# Patient Record
(demographics unavailable — no encounter records)

---

## 2025-03-24 NOTE — END OF VISIT
[FreeTextEntry3] : I have seen the patient and reviewed the case together with PA and I agree with the final recommendations and plan of care.  Eric Rosa MD Neurosurgery  [Time Spent: ___ minutes] : I have spent [unfilled] minutes of time on the encounter which excludes teaching and separately reported services.

## 2025-03-24 NOTE — REASON FOR VISIT
[FreeTextEntry1] : Mr. Tomas returns today for interval follow up and imaging review s/p Posterior Cervical C3-6 laminectomy and fusion on 5/2/23 to treat his cervical myelopathy.  Today, he reports worsening neck pain and radiculopathy into the shoulder. He denies numbness/tingling, weakness, bowel/bladder dysfunction.  His spasticity has been improving and he is requiring the baclofen only as needed.  Updated Cspine Xray AP/lateral reveals good placement of hardware.  He has been continuing with ongoing PT course since January and he continues to demonstrate improvement in his gait and myelopathic symptoms.   7/31/24 Mr. Tomas returns today for interval follow up and imaging review s/p Posterior Cervical C3-6 laminectomy and fusion on 5/2/23 to treat his cervical myelopathy.   Today, he reports improved but persistent spasticity of the lower >upper extremities and left >right.  He has undergone a follow up Xray Cervical spine AP/Lateral today, which I have independently reviewed and which demonstrates good placement of hardware and evidence of bony fusion.  He reports that he was felt to not be an ideal candidate for baclofen pump by Dr. Lui.  12/11/23: Mr. Tomas returns today for interval follow up and imaging review s/p Posterior Cervical C3-6 laminectomy and fusion on 5/2/23. to treat his cervical myelopathy.  He had prior lumbar fusion by Dr. Pederson in 2021 with subsequent removal of hardware in 2022.  He underwent SCS trial with Dr. Bansal with good response, however permanent SCS placement was deferred due to worsening spasticity.  He has been following with Dr. Nino.  MRI T spine performed on 11/30/23 at [facility] [place], and independently reviewed by me today, demonstrates moderate rightward scoliosis of the thoracic spine and severe spondylosis, mild anterolisthesis of T10 on T11 and canal stenosis of T10/11 with disc herniation and ligamentum flavum hypertrophy, without T2 change withing the cord.  Today, he reports mild improvement of spasticity with baclofen dose 20mg qid. He continues on gabapentin for pain.  He has been referred to Dr. Chauncey Lui for baclofen pump placement and trial is set up for 12/28.    8/9/23 Elian Tomas presents for interval 3 month follow up and imaging review for his Posterior cervical C3-6 laminectomy and fusion as well as discussion regarding permanent implantation of SCS for his low back and radicular leg pain R>L. He reports improvement in fine finger dexterity and neck pain since his cervical fusion. His XRay today shows good placement of hardware. His mobility was improving at home until he underwent percutaneous lead SCS trial and now states his spasms in legs and low back has worsened and he is less mobile and ambulatory. He reports a 95% improvement in his low back and leg pain during the trial however spasticity has increased and mobility have declined recently. The patient denies new numbness, tingling, weakness, bowel/bladder dysfunction, gait disturbance, fever, chills, drainage from incision, redness at incision site.  6/7/23: ELIAN TOMAS is a 63 year right-handed male with a PMH of HTN, GERD lumbar stenosis s/p L5-S1 posterior spinal fusion in 2021 by Dr. Pederson s/p removal of hardware 2022 who presents to the office today for neurosurgical followup of recent hospitalization at [facility] from 5/5/23 to 5/19/23 due to progressively worsening gait requiring a wheelchair for the last month prior to admission and he was having signs and symptoms of progressive cervical myelopathy (left hand clumsiness). MRI Cspine demonstrated severe stenosis and cord compression at C3-4 with T2 changes C3-5. He underwent a posterior C3-6 lami/fusion on 5/2/23. His post op course was unremarkable and he was discharged to [facility] on 5/5/23 with [place] Collar to be worn at all times and tapering dose of steroids. He has been compliant with his collar and complaining of cramping in legs, thighs, low back prior to surgery intermittently, self limiting. Placed on Balcofen per rehab facility. Reports slight improvement in hand dexterity. He is standing with assistance but not walking yet. He expresses dull intermittent neck pain radiating to arms which has improved over time. He expressed concern over the hardware in his neck. The patient denies new pain, numbness, tingling, weakness, bowel/bladder dysfunction, gait disturbance, fever, chills, drainage from incision, redness at incision site. Surg Hx: s/p L5-S1 posterior spinal fusion in 2021 by Dr. Pederson s/p removal of hardware 2022, hernia repair Meds: amlodipine, asa, bacid, baclofen, gabapentin, lovenox, nebivolol, pantoprazole, senokot, roxicodone Allergies: NKDA Soc Hx: current smoker, social EtOH

## 2025-03-24 NOTE — ASSESSMENT
[FreeTextEntry1] : Mr. Tomas has been doing well since his C3-6 posterior cervical laminectomy and fusion on 5/2/23. His incision is well healed. He should continue the current pain regimen. He should continue his current PT/OT regimen. His collar is off. He should return to the office as needed at this point.  AP/Lateral Xray Cspine showed good placement of hardware and evidence of bony fusion.  In the end, I recommend and additional course of physical therapy as he continues to steadily improve with his gait and balance and strength and spasticity with his PT regimen.  I have ordered additional PT course today.  Patient understood and agreed with our plan of care and decided to move forward with it. All questions were answered.

## 2025-03-24 NOTE — REASON FOR VISIT
[FreeTextEntry1] : Mr. Tomas returns today for interval follow up and imaging review s/p Posterior Cervical C3-6 laminectomy and fusion on 5/2/23 to treat his cervical myelopathy.  Today, he reports worsening neck pain and radiculopathy into the shoulder. He denies numbness/tingling, weakness, bowel/bladder dysfunction.  His spasticity has been improving and he is requiring the baclofen only as needed.  Updated Cspine Xray AP/lateral reveals good placement of hardware.  He has been continuing with ongoing PT course since January and he continues to demonstrate improvement in his gait and myelopathic symptoms.   7/31/24 Mr. Tomas returns today for interval follow up and imaging review s/p Posterior Cervical C3-6 laminectomy and fusion on 5/2/23 to treat his cervical myelopathy.   Today, he reports improved but persistent spasticity of the lower >upper extremities and left >right.  He has undergone a follow up Xray Cervical spine AP/Lateral today, which I have independently reviewed and which demonstrates good placement of hardware and evidence of bony fusion.  He reports that he was felt to not be an ideal candidate for baclofen pump by Dr. Lui.  12/11/23: Mr. Tomas returns today for interval follow up and imaging review s/p Posterior Cervical C3-6 laminectomy and fusion on 5/2/23. to treat his cervical myelopathy.  He had prior lumbar fusion by Dr. Pederson in 2021 with subsequent removal of hardware in 2022.  He underwent SCS trial with Dr. Bansal with good response, however permanent SCS placement was deferred due to worsening spasticity.  He has been following with Dr. Nino.  MRI T spine performed on 11/30/23 at [facility] [place], and independently reviewed by me today, demonstrates moderate rightward scoliosis of the thoracic spine and severe spondylosis, mild anterolisthesis of T10 on T11 and canal stenosis of T10/11 with disc herniation and ligamentum flavum hypertrophy, without T2 change withing the cord.  Today, he reports mild improvement of spasticity with baclofen dose 20mg qid. He continues on gabapentin for pain.  He has been referred to Dr. Chauncey Lui for baclofen pump placement and trial is set up for 12/28.    8/9/23 Elian Tomas presents for interval 3 month follow up and imaging review for his Posterior cervical C3-6 laminectomy and fusion as well as discussion regarding permanent implantation of SCS for his low back and radicular leg pain R>L. He reports improvement in fine finger dexterity and neck pain since his cervical fusion. His XRay today shows good placement of hardware. His mobility was improving at home until he underwent percutaneous lead SCS trial and now states his spasms in legs and low back has worsened and he is less mobile and ambulatory. He reports a 95% improvement in his low back and leg pain during the trial however spasticity has increased and mobility have declined recently. The patient denies new numbness, tingling, weakness, bowel/bladder dysfunction, gait disturbance, fever, chills, drainage from incision, redness at incision site.  6/7/23: ELIAN TOMAS is a 63 year right-handed male with a PMH of HTN, GERD lumbar stenosis s/p L5-S1 posterior spinal fusion in 2021 by Dr. Pederson s/p removal of hardware 2022 who presents to the office today for neurosurgical followup of recent hospitalization at [facility] from 5/5/23 to 5/19/23 due to progressively worsening gait requiring a wheelchair for the last month prior to admission and he was having signs and symptoms of progressive cervical myelopathy (left hand clumsiness). MRI Cspine demonstrated severe stenosis and cord compression at C3-4 with T2 changes C3-5. He underwent a posterior C3-6 lami/fusion on 5/2/23. His post op course was unremarkable and he was discharged to [facility] on 5/5/23 with [place] Collar to be worn at all times and tapering dose of steroids. He has been compliant with his collar and complaining of cramping in legs, thighs, low back prior to surgery intermittently, self limiting. Placed on Balcofen per rehab facility. Reports slight improvement in hand dexterity. He is standing with assistance but not walking yet. He expresses dull intermittent neck pain radiating to arms which has improved over time. He expressed concern over the hardware in his neck. The patient denies new pain, numbness, tingling, weakness, bowel/bladder dysfunction, gait disturbance, fever, chills, drainage from incision, redness at incision site. Surg Hx: s/p L5-S1 posterior spinal fusion in 2021 by Dr. Pederson s/p removal of hardware 2022, hernia repair Meds: amlodipine, asa, bacid, baclofen, gabapentin, lovenox, nebivolol, pantoprazole, senokot, roxicodone Allergies: NKDA Soc Hx: current smoker, social EtOH

## 2025-03-24 NOTE — PHYSICAL EXAM
[FreeTextEntry1] : Constitutional: alert and in no acute distress. Surgical Incision Surgical incision(s) located on the posterior neck. Incision shape/type: longitudinal. The incision was clean and well-healed.   Motor: muscle strength was normal in all four extremities.   Sensory exam: light touch was intact.   Coordination:. up with assist.   Deep tendon reflexes: Biceps right 2+. Biceps left 2+.  Triceps right 2+. Triceps left 2+.  Patella right 2+. Patella left 2+.   Ankle Clonus absent on the right, absent on the left.   Spine:  Gait: non-antalgic

## 2025-07-17 NOTE — PHYSICAL EXAM
[No Masses] : no abdominal mass palpated [Abdomen Soft] : soft [] : no hepatosplenomegaly [de-identified] : moderately protruberant,

## 2025-07-17 NOTE — ASSESSMENT
[FreeTextEntry1] : the incomplete evacuation of the stool has been mostly it seems caused by a dysmotility, Irritable BOwel like and i feel it should be managed by increasing dietary fiber...i dont believe Juan Pablo has maintained an adequate diet re fiber, and altough it was probably adequate in the contnext of good physical activity as he has always been active, and athletic, and that has no longer been practical   DR BEASLEY'S COMMON SENSE RECOMMENDATIONS FOR IMPROVING CONSTIPATION,  WITH OR WITHOUT PRESCRIPTION MEDICATION  1.  slowly increase dietary fiber 2.  breakfast is especially important for good bowel regime 3.  high fiber breakfast cereal such as Kashi or Fiber 1 is a useful way to increase dietary fiber 4.  hot beverage or hot cereal may also be helpful at breakfast 5.  fluid intake to avoid dehydration;  eight glasses of non caffeinated, non alcoholic fluids per day [64 fll oz] 6.  prunes can be helpful; 3-6 per day [alternative is prune juice] 7.  add unprocessed bran to foods, beginning with one teaspoon per day 8   add flaxseed to foods, starting with one tablespoon and increasing slowly

## 2025-07-17 NOTE — HISTORY OF PRESENT ILLNESS
[FreeTextEntry1] : 1.  Change in bowel habits, sense of incomplete evacuation, irregular and variable stools-in the setting of this patient who has had good health in the past but has now experienced significant neurologic symptomatology which seems to be doing to a cervical myelopathy from cord compression by a cervical stenosis.  He did have a decompressive surgery and hardware placed but he did not recover completely although his excellent motivation has led to substantial improvement The symptoms can best be described as a very uncomfortable sense of incomplete evacuation and the need to do several bowel movements to get his stools completely evacuated and sometimes abdominal discomfort and bloating that seems to start in the epigastrium and is related to bowel function it seems and is improved when he does have a good evacuation. He has not changed his diet recently On October 21, 2024 he had a colonoscopy because of a history of moderately large adenomatous polyps and this colonoscopy was negative.  He was going for frequent colonoscopies and has been going for the fees because of history in the family of colon cancer to which his sister succumbed.